# Patient Record
Sex: MALE | Race: BLACK OR AFRICAN AMERICAN | NOT HISPANIC OR LATINO | URBAN - METROPOLITAN AREA
[De-identification: names, ages, dates, MRNs, and addresses within clinical notes are randomized per-mention and may not be internally consistent; named-entity substitution may affect disease eponyms.]

---

## 2017-06-21 ENCOUNTER — EMERGENCY (EMERGENCY)
Facility: HOSPITAL | Age: 22
LOS: 1 days | Discharge: HOME | End: 2017-06-21
Attending: EMERGENCY MEDICINE | Admitting: EMERGENCY MEDICINE
Payer: COMMERCIAL

## 2017-06-21 VITALS
OXYGEN SATURATION: 99 % | TEMPERATURE: 99 F | RESPIRATION RATE: 17 BRPM | WEIGHT: 160.06 LBS | HEIGHT: 71 IN | HEART RATE: 65 BPM | DIASTOLIC BLOOD PRESSURE: 77 MMHG | SYSTOLIC BLOOD PRESSURE: 122 MMHG

## 2017-06-21 DIAGNOSIS — H10.89 OTHER CONJUNCTIVITIS: ICD-10-CM

## 2017-06-21 DIAGNOSIS — Z98.89 OTHER SPECIFIED POSTPROCEDURAL STATES: Chronic | ICD-10-CM

## 2017-06-21 PROCEDURE — 99283 EMERGENCY DEPT VISIT LOW MDM: CPT | Mod: 25

## 2017-06-21 PROCEDURE — 99053 MED SERV 10PM-8AM 24 HR FAC: CPT

## 2017-06-21 PROCEDURE — 99283 EMERGENCY DEPT VISIT LOW MDM: CPT

## 2017-06-21 RX ORDER — POLYMYXIN B SULF/TRIMETHOPRIM 10000-1/ML
1 DROPS OPHTHALMIC (EYE)
Qty: 1 | Refills: 0 | OUTPATIENT
Start: 2017-06-21 | End: 2017-06-28

## 2017-06-21 RX ORDER — IBUPROFEN 200 MG
600 TABLET ORAL ONCE
Qty: 0 | Refills: 0 | Status: COMPLETED | OUTPATIENT
Start: 2017-06-21 | End: 2017-06-21

## 2017-06-21 RX ORDER — IBUPROFEN 200 MG
1 TABLET ORAL
Qty: 20 | Refills: 0 | OUTPATIENT
Start: 2017-06-21 | End: 2017-06-26

## 2017-06-21 RX ADMIN — Medication 600 MILLIGRAM(S): at 23:22

## 2017-06-21 RX ADMIN — Medication 600 MILLIGRAM(S): at 23:17

## 2017-06-22 NOTE — ED PROVIDER NOTE - MEDICAL DECISION MAKING DETAILS
20 yo M with no Past Medical History that presents with pink eye in bilateral eyes x 5 days, not improving. Most likely bacterial conjunctivitis. Will provide pain meds here send home with Rx for Abx eye drops, f/u with PMD and  MD that he has as pt has this condition often per brother. Rec to stop touching eye or good hygiene if need to itch eye. Return for any worsening symptoms.

## 2017-06-22 NOTE — ED PROVIDER NOTE - OBJECTIVE STATEMENT
22 yo M with no Past Medical History that presents with bilateral eye pain and redness x 5 days, worsening, waking up with eye crust, yellow and green, itching eyes. Denies vision/hearing changes. No fevers, chills, nausea, vomiting, diarrhea, abd pain, chest pain, SOB, weakness. No recent illnesses. No chemical or gas exposures. He does not wear contacts or glasses. Has a gritty sensation in eyes but no FB. Has had this before, brother reports he gets this often because he is always picking at his eyes. Pt denies joint pain, not sexually active, no penile discharge, no rash.

## 2021-12-23 ENCOUNTER — EMERGENCY (EMERGENCY)
Facility: HOSPITAL | Age: 26
LOS: 1 days | Discharge: HOME | End: 2021-12-23
Admitting: EMERGENCY MEDICINE
Payer: COMMERCIAL

## 2021-12-23 VITALS
OXYGEN SATURATION: 99 % | SYSTOLIC BLOOD PRESSURE: 119 MMHG | RESPIRATION RATE: 16 BRPM | DIASTOLIC BLOOD PRESSURE: 80 MMHG | HEART RATE: 103 BPM | TEMPERATURE: 100 F

## 2021-12-23 DIAGNOSIS — Z98.89 OTHER SPECIFIED POSTPROCEDURAL STATES: Chronic | ICD-10-CM

## 2021-12-23 PROCEDURE — 99282 EMERGENCY DEPT VISIT SF MDM: CPT

## 2021-12-23 NOTE — ED PROVIDER NOTE - OBJECTIVE STATEMENT
27 y/o M presents to ED for COVID testing. Pt currently asymptomatic. No chest pain, sob, cough, abd pain, n/v/d, headache, dizziness. unknown sick contacts. No recent travel. Pt well appearing. NAD.

## 2021-12-23 NOTE — ED ADULT TRIAGE NOTE - CHIEF COMPLAINT QUOTE
Pt arrives to ED for Covid test b/c of a family gathering with at risk relatives.  Pt 100.3F in triage.  Reports chills earlier in the day.

## 2021-12-23 NOTE — ED PROVIDER NOTE - PATIENT PORTAL LINK FT
You can access the FollowMyHealth Patient Portal offered by [facility] by registering at the following website: [email or web address]. By joining [employer]’s FollowMyHealth portal, you will also be able to view your health information using other applications (apps) compatible with our system.

## 2021-12-24 LAB
FLUAV AG NPH QL: SIGNIFICANT CHANGE UP
FLUBV AG NPH QL: SIGNIFICANT CHANGE UP
RSV RNA NPH QL NAA+NON-PROBE: SIGNIFICANT CHANGE UP
SARS-COV-2 RNA SPEC QL NAA+PROBE: DETECTED

## 2022-02-16 ENCOUNTER — EMERGENCY (EMERGENCY)
Facility: HOSPITAL | Age: 27
LOS: 1 days | Discharge: HOME | End: 2022-02-16
Attending: EMERGENCY MEDICINE | Admitting: EMERGENCY MEDICINE
Payer: SELF-PAY

## 2022-02-16 VITALS
HEART RATE: 56 BPM | SYSTOLIC BLOOD PRESSURE: 120 MMHG | TEMPERATURE: 98 F | RESPIRATION RATE: 17 BRPM | DIASTOLIC BLOOD PRESSURE: 69 MMHG | OXYGEN SATURATION: 99 %

## 2022-02-16 DIAGNOSIS — Z98.89 OTHER SPECIFIED POSTPROCEDURAL STATES: Chronic | ICD-10-CM

## 2022-02-16 PROCEDURE — 99283 EMERGENCY DEPT VISIT LOW MDM: CPT

## 2022-02-16 RX ORDER — OXYCODONE HYDROCHLORIDE 5 MG/1
1 TABLET ORAL
Qty: 3 | Refills: 0
Start: 2022-02-16 | End: 2022-02-16

## 2022-02-16 RX ORDER — IBUPROFEN 200 MG
1 TABLET ORAL
Qty: 8 | Refills: 0
Start: 2022-02-16 | End: 2022-02-17

## 2022-02-16 RX ORDER — OXYCODONE AND ACETAMINOPHEN 5; 325 MG/1; MG/1
1 TABLET ORAL ONCE
Refills: 0 | Status: DISCONTINUED | OUTPATIENT
Start: 2022-02-16 | End: 2022-02-16

## 2022-02-16 RX ADMIN — OXYCODONE AND ACETAMINOPHEN 1 TABLET(S): 5; 325 TABLET ORAL at 19:05

## 2022-02-16 RX ADMIN — Medication 1 DROP(S): at 19:23

## 2022-02-16 NOTE — ED PROVIDER NOTE - CLINICAL SUMMARY MEDICAL DECISION MAKING FREE TEXT BOX
26yM w/pmhx no stated pmhx presenting with left eye pain. Pt states earlier today he accidentally poked himself in the eye with his fingernail. Reports pain, blurry vision, foreign body sensation and tearing. Exam consistent with corneal abrasion. Will d/c with erythromycin ointment and optho follow up. He will return with any worsening or concerning symptoms

## 2022-02-16 NOTE — ED PROVIDER NOTE - ATTENDING CONTRIBUTION TO CARE
HPI: 27 yo M with no stated Past Medical History that is presenting with left eye pain. Pt states earlier today he accidentally poked himself in the eye with his fingernail. Initially had some discomfort but after waking from a nap felt more severe pain to the left eye. He has photophobia, eye tearing and blurry vision in the left eye. He reports 2 prior corneal abrasions to the same eye. Pt denies headache, purulent drainage from eye, diplopia or any other concerns.  EXAM: Uncomfortable appearing, eye is tearing, no globe rupture, no bleeding, lids normal, no periorbital swelling, speaking full sentences, EOMI/PERRL, right eye is normal. Fluroscein shows abrasion 6 oclock to pupil, no FB seen.   MDM: Pt with 2 previous corneal abrasions that is here for eye pain sudden onset that was found on fluoroscein exam to have another abrasion. Pain controlled in ED. WIll discharge with PO pain meds, erythromycin ointment and f/u with ophthal recommended. Return precautions explained.

## 2022-02-16 NOTE — ED ADULT TRIAGE NOTE - CHIEF COMPLAINT QUOTE
Pt complaining of pain to left eye after accidentally poking self with fingernail. Pain worsening throughout the day- endorsing blurry vision and light sensitivity, minor swelling noted to left eye.

## 2022-02-16 NOTE — ED PROVIDER NOTE - PATIENT PORTAL LINK FT
You can access the FollowMyHealth Patient Portal offered by [facility] by registering at the following website: [email or web address]. By joining [employer]’s FollowMyHealth portal, you will also be able to view your health information using other applications (apps) compatible with our system.

## 2022-02-16 NOTE — ED PROVIDER NOTE - NSFOLLOWUPINSTRUCTIONS_ED_ALL_ED_FT
Follow up with an ophthalmologist within 3-5 days, clinic information attached, please call to make an appointment  Follow up with your primary care doctor within 1 week  Apply Erythromycin ointment to the left eye 4 times a day for 5 days  Take Ibuprofen 600mg every 6-8 hours as needed for pain, take with food  -You can also take Tylenol 650mg every 6 hours as needed for pain  For severe pain take Oxycodone 5mg (1 tablet) every 6 hours, caution drowsiness do not drive or drink alcohol while taking  Return to the ER with any worsening or concerning symptoms, increased pain, eye swelling, changes to your vision, fever/chills or any other concerns.

## 2022-02-16 NOTE — ED PROVIDER NOTE - CONSTITUTIONAL, MLM
Placed a call to [employer] [address] to see if application for co-pay assistance had been processed and was told that it would be 7 to 10 business days from 1-24-20 before it would be through processing. I will keep in touch with PAF to see what the outcome is. normal... Pt appears uncomfortable, awake, alert, oriented to person, place, time/situation and in no apparent distress.

## 2022-02-16 NOTE — ED PROVIDER NOTE - OBJECTIVE STATEMENT
26yM w/pmhx no stated pmhx presenting with left eye pain. Pt states earlier today he accidentally poked himself in the eye with his fingernail. Did not initially feel pain but throughout the day the eye has become painful. He has photophobia and blurry vision in the left eye. He reports 2 prior 26yM w/pmhx no stated pmhx presenting with left eye pain. Pt states earlier today he accidentally poked himself in the eye with his fingernail. Initially had some discomfort but after waking from a nap felt more severe pain to the left eye. He has photophobia, eye tearing and blurry vision in the left eye. He reports 2 prior corneal abrasions to the same eye. Pt denies headache, purulent drainage from eye, diplopia or any other concerns.

## 2022-02-16 NOTE — ED ADULT NURSE NOTE - NSIMPLEMENTINTERV_GEN_ALL_ED
Implemented All Universal Safety Interventions:  [place] to call system. Call bell, personal items and telephone within reach. Instruct patient to call for assistance. Room bathroom lighting operational. Non-slip footwear when patient is off stretcher. Physically safe environment: no spills, clutter or unnecessary equipment. Stretcher in lowest position, wheels locked, appropriate side rails in place.

## 2022-02-16 NOTE — ED ADULT NURSE NOTE - OBJECTIVE STATEMENT
received to intake spot 10c. complains of left eye pain s/p accidentally poking eye with fingernail. pain has worsened throughout the day/ hs some blurry vision and photophobia. hx 2 corneal abrasions to same eye in past. medicated as ordered. awaiting md moura in nad

## 2022-02-16 NOTE — ED PROVIDER NOTE - CPE EDP RESP NORM
-Pt POD3, s/p L hip I&D, wound without s/s over bleed, infection  - s/p 3 U pRBCs total (12/6 x1 unit pRBC's and 12/8 x2 units pRBC's)  - appropriate response today, will continue to monitor h/h normal...

## 2022-07-06 NOTE — ED ADULT NURSE NOTE - CAS DISCH ACCOMP BY
It was a pleasure taking care of you at [facility] Emergency Department today. We know that when you come to [facility], you are entrusting us with your health, comfort, and safety. Our physicians and nurses honor that trust, and we truly appreciate the opportunity to care for you and your loved ones. We also value your feedback. If you receive a survey about your Emergency Department experience today, please fill it out. We care about our patients' feedback, and we listen to what you have to say. Thank you!
Significant other

## 2023-01-27 NOTE — ED ADULT TRIAGE NOTE - RESPIRATORY RATE (BREATHS/MIN)

## 2024-10-17 ENCOUNTER — EMERGENCY (EMERGENCY)
Facility: HOSPITAL | Age: 29
LOS: 1 days | Discharge: HOME | End: 2024-10-17
Admitting: EMERGENCY MEDICINE
Payer: MEDICAID

## 2024-10-17 VITALS
WEIGHT: 164.91 LBS | OXYGEN SATURATION: 100 % | RESPIRATION RATE: 15 BRPM | TEMPERATURE: 98 F | HEART RATE: 68 BPM | SYSTOLIC BLOOD PRESSURE: 150 MMHG | DIASTOLIC BLOOD PRESSURE: 95 MMHG

## 2024-10-17 DIAGNOSIS — Z98.89 OTHER SPECIFIED POSTPROCEDURAL STATES: Chronic | ICD-10-CM

## 2024-10-17 PROBLEM — Z78.9 OTHER SPECIFIED HEALTH STATUS: Chronic | Status: ACTIVE | Noted: 2022-02-16

## 2024-10-17 PROCEDURE — 99283 EMERGENCY DEPT VISIT LOW MDM: CPT

## 2024-10-17 RX ORDER — KETOROLAC TROMETHAMINE 30 MG/ML
15 INJECTION, SOLUTION INTRAMUSCULAR; INTRAVENOUS ONCE
Refills: 0 | Status: DISCONTINUED | OUTPATIENT
Start: 2024-10-17 | End: 2024-10-17

## 2024-10-17 RX ORDER — IBUPROFEN 200 MG
1 TABLET ORAL
Qty: 30 | Refills: 0
Start: 2024-10-17 | End: 2024-10-26

## 2024-10-17 RX ORDER — AMOXICILLIN AND CLAVULANATE POTASSIUM 500; 125 MG/1; MG/1
1 TABLET, FILM COATED ORAL ONCE
Refills: 0 | Status: COMPLETED | OUTPATIENT
Start: 2024-10-17 | End: 2024-10-17

## 2024-10-17 RX ORDER — AMOXICILLIN AND CLAVULANATE POTASSIUM 500; 125 MG/1; MG/1
1 TABLET, FILM COATED ORAL
Qty: 14 | Refills: 0
Start: 2024-10-17 | End: 2024-10-23

## 2024-10-17 RX ADMIN — KETOROLAC TROMETHAMINE 15 MILLIGRAM(S): 30 INJECTION, SOLUTION INTRAMUSCULAR; INTRAVENOUS at 04:31

## 2024-10-17 RX ADMIN — AMOXICILLIN AND CLAVULANATE POTASSIUM 1 TABLET(S): 500; 125 TABLET, FILM COATED ORAL at 04:36

## 2024-11-10 ENCOUNTER — EMERGENCY (EMERGENCY)
Facility: HOSPITAL | Age: 29
LOS: 1 days | Discharge: HOME | End: 2024-11-10
Attending: EMERGENCY MEDICINE | Admitting: EMERGENCY MEDICINE
Payer: MEDICAID

## 2024-11-10 VITALS
TEMPERATURE: 98 F | DIASTOLIC BLOOD PRESSURE: 76 MMHG | OXYGEN SATURATION: 97 % | SYSTOLIC BLOOD PRESSURE: 120 MMHG | HEART RATE: 80 BPM | RESPIRATION RATE: 16 BRPM

## 2024-11-10 DIAGNOSIS — Z98.89 OTHER SPECIFIED POSTPROCEDURAL STATES: Chronic | ICD-10-CM

## 2024-11-10 PROCEDURE — 99283 EMERGENCY DEPT VISIT LOW MDM: CPT

## 2024-11-10 RX ORDER — ACETAMINOPHEN 500 MG
975 TABLET ORAL ONCE
Refills: 0 | Status: DISCONTINUED | OUTPATIENT
Start: 2024-11-10 | End: 2024-11-10

## 2024-11-10 RX ORDER — IBUPROFEN 200 MG
1 TABLET ORAL
Qty: 36 | Refills: 0
Start: 2024-11-10 | End: 2024-11-18

## 2024-11-10 RX ORDER — KETOROLAC TROMETHAMINE 30 MG/ML
15 INJECTION INTRAMUSCULAR; INTRAVENOUS ONCE
Refills: 0 | Status: DISCONTINUED | OUTPATIENT
Start: 2024-11-10 | End: 2024-11-10

## 2024-11-10 RX ADMIN — KETOROLAC TROMETHAMINE 15 MILLIGRAM(S): 30 INJECTION INTRAMUSCULAR; INTRAVENOUS at 09:25

## 2024-11-10 NOTE — ED PROVIDER NOTE - PATIENT PORTAL LINK FT
You can access the FollowMyHealth Patient Portal offered by [facility] by registering at the following website: [email or web address]. By joining [facility]’s FollowMyHealth portal, you will also be able to view your health information using other applications (apps) compatible with our system.

## 2024-11-10 NOTE — ED PROVIDER NOTE - PROGRESS NOTE DETAILS
Pain improved.  Sent prescription for ibuprofen to pharmacy.  Also provided contact information for our dental clinic.  Negrito Wells PGY-3

## 2024-11-10 NOTE — ED PROVIDER NOTE - ATTENDING CONTRIBUTION TO CARE
28 y/o M with no PMHx p/w L dental pain, worsened today. Reports tried home oxycodone with no relief. Is scheduled for extraction November 19th. Endorses similar pain with relief with Toradol in the past. No facial swelling, fevers, chills, throat pain, difficulty swallowing, difficulty breathing, chest pain, SOB, abdominal pain, nausea, vomiting or any other concerning symptoms. Afebrile. HR 80. L lower molar with no fluctuance noted around it. Plan- Toradol/Tylenol for pain control, Dental f/u with return to ER precautions

## 2024-11-10 NOTE — ED PROVIDER NOTE - NSFOLLOWUPINSTRUCTIONS_ED_ALL_ED_FT
You were seen in the ED for dental pain.    Please follow up with your dentist on November 19th.    You can use 500-1000mg Tylenol every 6 hours for pain - as needed.  This is an over-the-counter medications - please respect the warnings on the label. This medication come with certain risks and side effects that you need to discuss with your doctor, especially if you are taking it for a prolonged period.    You can use 400-600mg Ibuprofen (such as motrin or advil) every 6 to 8 hours as needed for pain control.  Take ibuprofen with food or milk to lessen stomach upset.  This is an over-the-counter medication please respect the warnings on the label. All medications come with certain risks and side effects that you need to discuss with your doctor, especially if you are taking them for a prolonged period.    Return to the ED for new or worsening symptoms.

## 2024-11-10 NOTE — ED ADULT NURSE NOTE - NSFALLUNIVINTERV_ED_ALL_ED
Bed/Stretcher in lowest position, wheels locked, appropriate side rails in place/Call bell, personal items and telephone in reach/Instruct patient to call for assistance before getting out of bed/chair/stretcher/Non-slip footwear applied when patient is off stretcher/[place] to call system/Physically safe environment - no spills, clutter or unnecessary equipment/Purposeful proactive rounding/Room/bathroom lighting operational, light cord in reach

## 2024-11-10 NOTE — ED ADULT NURSE NOTE - OBJECTIVE STATEMENT
30 y/o M arrives to E.D. spot 1-A c/o dental pain for several weeks. Denies PHx. Pt is a&ox4, appears fatigued, ambulatory, neg SOB, denies chest pain, neg N/V/D, denies fevers/cough/body aches. Pending outpatient dental appt for extraction. Medicated as ordered. Taking Tylenol and Oxycodone at home without relief. Frequent monitoring in place.

## 2024-11-10 NOTE — ED PROVIDER NOTE - PHYSICAL EXAMINATION
GEN: NAD. A&Ox3. Non-toxic appearing.  HEENT: normocephalic, atraumatic, no facial swelling, poor dentition of left lower molars with no fluctuance or point tenderness   CARDIAC: RRR, S1, S2, no murmur  PULM: CTA B/L no wheeze, rhonchi, rales.   ABD: soft nondistended   MSK: Moving all extremities, no edema  NEURO: gait normal, no focal neurological deficits  SKIN: warm, dry, no rash.

## 2024-11-10 NOTE — ED PROVIDER NOTE - CLINICAL SUMMARY MEDICAL DECISION MAKING FREE TEXT BOX
29-year-old male no pertinent past medical history presents with acute on chronic left lower molar tooth pain.  Patient states has an appointment on November 19 for tooth extraction.  Woke up this morning with worsening pain than usual.  Took oxycodone approximately 30 minutes prior to arrival.  Able to tolerate p.o.  Denies fevers, facial swelling, neck pain or stiffness, nausea or vomiting.  Afebrile, poor dentition of left lower molars, no fluctuance or point tenderness noted.  Face symmetrical with no overlying tenderness.  Full range of motion neck.  Likely worsening pain from poor dentition.  States Toradol helped last time he was in the ED.  Will give Toradol, Tylenol, discharged with strict return precautions.

## 2024-11-10 NOTE — ED ADULT TRIAGE NOTE - CHIEF COMPLAINT QUOTE
pt c/o left side dental pain, due to have extraction, pain worse today. denies med hx. no relief with oxycodone PTA.

## 2025-01-14 NOTE — ED ADULT NURSE NOTE - NSICDXPASTSURGICALHX_GEN_ALL_CORE_FT
[Patient Declined  Services] : - None: Patient declined  services [FreeTextEntry3] :  Shared Language [place] with MD PAST SURGICAL HISTORY:  No significant past surgical history

## 2025-07-02 NOTE — ED ADULT TRIAGE NOTE - MEANS OF ARRIVAL
Home health nurse called in for home health pt. Who had elevated blood pressure. 200/101. Pt hadn't taken medication for the day. Pt took medication after the fact and blood pressure began to go down 170/90.    Provider-MD made aware of all findings.    ambulatory